# Patient Record
Sex: MALE | Race: WHITE | NOT HISPANIC OR LATINO
[De-identification: names, ages, dates, MRNs, and addresses within clinical notes are randomized per-mention and may not be internally consistent; named-entity substitution may affect disease eponyms.]

---

## 2017-04-28 ENCOUNTER — RESULT REVIEW (OUTPATIENT)
Age: 37
End: 2017-04-28

## 2017-04-28 ENCOUNTER — INPATIENT (INPATIENT)
Facility: HOSPITAL | Age: 37
LOS: 0 days | Discharge: HOME | DRG: 343 | End: 2017-04-29
Attending: SURGERY | Admitting: SURGERY
Payer: COMMERCIAL

## 2017-04-28 VITALS
WEIGHT: 151.9 LBS | OXYGEN SATURATION: 98 % | TEMPERATURE: 97 F | DIASTOLIC BLOOD PRESSURE: 84 MMHG | HEART RATE: 115 BPM | HEIGHT: 73 IN | SYSTOLIC BLOOD PRESSURE: 115 MMHG | RESPIRATION RATE: 18 BRPM

## 2017-04-28 LAB
ALBUMIN SERPL ELPH-MCNC: 4.6 G/DL — SIGNIFICANT CHANGE UP (ref 3.4–5)
ALP SERPL-CCNC: 41 U/L — SIGNIFICANT CHANGE UP (ref 40–120)
ALT FLD-CCNC: 33 U/L — SIGNIFICANT CHANGE UP (ref 12–42)
ANION GAP SERPL CALC-SCNC: 9 MMOL/L — SIGNIFICANT CHANGE UP (ref 9–16)
APPEARANCE UR: CLEAR — SIGNIFICANT CHANGE UP
APTT BLD: 28.7 SEC — SIGNIFICANT CHANGE UP (ref 27.5–37.4)
AST SERPL-CCNC: 19 U/L — SIGNIFICANT CHANGE UP (ref 15–37)
BASOPHILS NFR BLD AUTO: 0.3 % — SIGNIFICANT CHANGE UP (ref 0–2)
BILIRUB SERPL-MCNC: 1.4 MG/DL — HIGH (ref 0.2–1.2)
BILIRUB UR-MCNC: NEGATIVE — SIGNIFICANT CHANGE UP
BLD GP AB SCN SERPL QL: NEGATIVE — SIGNIFICANT CHANGE UP
BUN SERPL-MCNC: 11 MG/DL — SIGNIFICANT CHANGE UP (ref 7–23)
CALCIUM SERPL-MCNC: 9.5 MG/DL — SIGNIFICANT CHANGE UP (ref 8.5–10.5)
CHLORIDE SERPL-SCNC: 104 MMOL/L — SIGNIFICANT CHANGE UP (ref 96–108)
CO2 SERPL-SCNC: 26 MMOL/L — SIGNIFICANT CHANGE UP (ref 22–31)
COLOR SPEC: YELLOW — SIGNIFICANT CHANGE UP
CREAT SERPL-MCNC: 0.86 MG/DL — SIGNIFICANT CHANGE UP (ref 0.5–1.3)
DIFF PNL FLD: NEGATIVE — SIGNIFICANT CHANGE UP
EOSINOPHIL NFR BLD AUTO: 0.1 % — SIGNIFICANT CHANGE UP (ref 0–6)
GLUCOSE SERPL-MCNC: 97 MG/DL — SIGNIFICANT CHANGE UP (ref 70–99)
GLUCOSE UR QL: NEGATIVE — SIGNIFICANT CHANGE UP
HCT VFR BLD CALC: 40.6 % — SIGNIFICANT CHANGE UP (ref 39–50)
HGB BLD-MCNC: 14.7 G/DL — SIGNIFICANT CHANGE UP (ref 13–17)
INR BLD: 1.02 — SIGNIFICANT CHANGE UP (ref 0.88–1.16)
KETONES UR-MCNC: NEGATIVE — SIGNIFICANT CHANGE UP
LACTATE SERPL-SCNC: 1.5 MMOL/L — SIGNIFICANT CHANGE UP (ref 0.5–2)
LEUKOCYTE ESTERASE UR-ACNC: NEGATIVE — SIGNIFICANT CHANGE UP
LYMPHOCYTES # BLD AUTO: 9 % — LOW (ref 13–44)
MCHC RBC-ENTMCNC: 31.4 PG — SIGNIFICANT CHANGE UP (ref 27–34)
MCHC RBC-ENTMCNC: 36.2 G/DL — HIGH (ref 32–36)
MCV RBC AUTO: 86.8 FL — SIGNIFICANT CHANGE UP (ref 80–100)
MONOCYTES NFR BLD AUTO: 5.3 % — SIGNIFICANT CHANGE UP (ref 2–14)
NEUTROPHILS NFR BLD AUTO: 85.3 % — HIGH (ref 43–77)
NITRITE UR-MCNC: NEGATIVE — SIGNIFICANT CHANGE UP
PH UR: 8.5 — HIGH (ref 5–8)
PLATELET # BLD AUTO: 287 K/UL — SIGNIFICANT CHANGE UP (ref 150–400)
POTASSIUM SERPL-MCNC: 3.6 MMOL/L — SIGNIFICANT CHANGE UP (ref 3.5–5.3)
POTASSIUM SERPL-SCNC: 3.6 MMOL/L — SIGNIFICANT CHANGE UP (ref 3.5–5.3)
PROT SERPL-MCNC: 8.2 G/DL — SIGNIFICANT CHANGE UP (ref 6.4–8.2)
PROT UR-MCNC: NEGATIVE MG/DL — SIGNIFICANT CHANGE UP
PROTHROM AB SERPL-ACNC: 11.3 SEC — SIGNIFICANT CHANGE UP (ref 9.8–12.7)
RBC # BLD: 4.68 M/UL — SIGNIFICANT CHANGE UP (ref 4.2–5.8)
RBC # FLD: 13.1 % — SIGNIFICANT CHANGE UP (ref 10.3–16.9)
RH IG SCN BLD-IMP: POSITIVE — SIGNIFICANT CHANGE UP
SODIUM SERPL-SCNC: 139 MMOL/L — SIGNIFICANT CHANGE UP (ref 135–145)
SP GR SPEC: 1.01 — SIGNIFICANT CHANGE UP (ref 1–1.03)
UROBILINOGEN FLD QL: 0.2 E.U./DL — SIGNIFICANT CHANGE UP
WBC # BLD: 12 K/UL — HIGH (ref 3.8–10.5)
WBC # FLD AUTO: 12 K/UL — HIGH (ref 3.8–10.5)

## 2017-04-28 PROCEDURE — 44970 LAPAROSCOPY APPENDECTOMY: CPT

## 2017-04-28 PROCEDURE — 71020: CPT | Mod: 26

## 2017-04-28 PROCEDURE — 99285 EMERGENCY DEPT VISIT HI MDM: CPT | Mod: 25

## 2017-04-28 PROCEDURE — 93010 ELECTROCARDIOGRAM REPORT: CPT | Mod: NC

## 2017-04-28 PROCEDURE — 99221 1ST HOSP IP/OBS SF/LOW 40: CPT | Mod: 57

## 2017-04-28 RX ORDER — HYDROMORPHONE HYDROCHLORIDE 2 MG/ML
0.2 INJECTION INTRAMUSCULAR; INTRAVENOUS; SUBCUTANEOUS EVERY 4 HOURS
Qty: 0 | Refills: 0 | Status: DISCONTINUED | OUTPATIENT
Start: 2017-04-28 | End: 2017-04-29

## 2017-04-28 RX ORDER — ONDANSETRON 8 MG/1
4 TABLET, FILM COATED ORAL EVERY 4 HOURS
Qty: 0 | Refills: 0 | Status: DISCONTINUED | OUTPATIENT
Start: 2017-04-28 | End: 2017-04-29

## 2017-04-28 RX ORDER — HYDROMORPHONE HYDROCHLORIDE 2 MG/ML
0.5 INJECTION INTRAMUSCULAR; INTRAVENOUS; SUBCUTANEOUS EVERY 4 HOURS
Qty: 0 | Refills: 0 | Status: DISCONTINUED | OUTPATIENT
Start: 2017-04-28 | End: 2017-04-29

## 2017-04-28 RX ORDER — CEFOTETAN DISODIUM 1 G
2 VIAL (EA) INJECTION ONCE
Qty: 0 | Refills: 0 | Status: COMPLETED | OUTPATIENT
Start: 2017-04-28 | End: 2017-04-28

## 2017-04-28 RX ORDER — ONDANSETRON 8 MG/1
4 TABLET, FILM COATED ORAL ONCE
Qty: 0 | Refills: 0 | Status: COMPLETED | OUTPATIENT
Start: 2017-04-28 | End: 2017-04-28

## 2017-04-28 RX ORDER — HYDROMORPHONE HYDROCHLORIDE 2 MG/ML
0.5 INJECTION INTRAMUSCULAR; INTRAVENOUS; SUBCUTANEOUS
Qty: 0 | Refills: 0 | Status: DISCONTINUED | OUTPATIENT
Start: 2017-04-28 | End: 2017-04-29

## 2017-04-28 RX ADMIN — Medication 100 GRAM(S): at 13:33

## 2017-04-28 NOTE — H&P ADULT - NSHPREVIEWOFSYSTEMS_GEN_ALL_CORE
General: No fevers/chills, decreased appetite  HEENT: No blurry or double vision, no runny nose  Respiratory: No cough, no shortness of breath  Cardiovascular: No palpitations, no chest pain  Gastrointestinal: + abdominal pain, nausea; No emesis, constipation, diarrhea, melena  Genitourinary: No dysuria, no urinary frequency  Integumentary: No rashes  Musculoskeletal: No muscle aches, no joint pain  Neuro: No numbness or tingling, difficulty with gait  Psych: No depression, no change in behavior

## 2017-04-28 NOTE — ED ADULT NURSE NOTE - OBJECTIVE STATEMENT
Pt came to ED complaining of RLQ pain starting yesterday evening. Pt went to Urgent care and CT, instructed by MD to come to ED for confirmed appendicitis.  Pt currently denies nausea, vomiting, reports diarrhea. Abd is soft, tender in RLQ.  Pt denies chest pain, SOB,  symptoms, Positive PMS x4 extremities, alert and oriented x3.

## 2017-04-28 NOTE — ED ADULT NURSE NOTE - CHPI ED SYMPTOMS NEG
no hematuria/no vomiting/no chills/no abdominal distension/no blood in stool/no burning urination/no fever/no dysuria

## 2017-04-28 NOTE — H&P ADULT - NSHPPHYSICALEXAM_GEN_ALL_CORE
Gen: NAD, resting comfortably in bed, wn/wd  Neuro: A&Ox3, CN II-XII grossly intact, no deficits, wells  C/V: RRR, no m/r/g, +S1S2  Pulm: CTAB, no wheeze/rales/rhonchi, no respiratory distress, no increased wob  Abd: thin, soft, ND, moderate point tenderness RLQ, + rovsing's sign, - psoas/obturator signs, no rebound/guarding, +BS  Ext/Pulses: wwp, no c/c/e, palpable DP/PT b/l

## 2017-04-28 NOTE — H&P ADULT - ATTENDING COMMENTS
Patient seen and examined with surgical housestaff. CT scan reviewed (images). His history, physical examination and imaging are consistent with acute appendicitis. We discussed treatment options. We discussed the risks, benefits and alternatives to laparoscopic appendectomy with the patient including but not limited to bleeding, infection, death, disability, need for additional procedures, chronic pain, numbness, abscess, leak, need for open procedure and other issues. he does wish to proceed with surgery. I answered all questions.

## 2017-04-28 NOTE — PROGRESS NOTE ADULT - SUBJECTIVE AND OBJECTIVE BOX
Surgery Post-Op Note    Pre-Op Dx: Acute appendicitis  Procedure: Appendectomy, laparoscopic    Surgeon: Zaire    Subjective:   Pt seen and examined at the bedside. Pt w/ no complaints. Denies F/C/N/V. Pain controlled with medication.     Vital Signs Last 24 Hrs  T(C): 37.2, Max: 37.2 ( @ 21:00)  T(F): 99, Max: 99 ( @ 21:00)  HR: 89 (74 - 115)  BP: 111/74 (101/58 - 129/76)  BP(mean): --  RR: 14 (14 - 18)  SpO2: 98% (98% - 100%)    Physical Exam:  General: NAD, resting comfortably in bed  Neuro: A/O x 3, no focal deficits  Pulmonary: Nonlabored breathing, no respiratory distress  Cardiovascular: NSR  Abdominal: soft, ATTP. ND  Incision: C/D/I   Drains: N/A  Extremities: WWP        LABS:                        14.7   12.0  )-----------( 287      ( 2017 13:25 )             40.6         139  |  104  |  11  ----------------------------<  97  3.6   |  26  |  0.86    Ca    9.5      2017 13:25    TPro  8.2  /  Alb  4.6  /  TBili  1.4<H>  /  DBili  x   /  AST  19  /  ALT  33  /  AlkPhos  41      PT/INR - ( 2017 13:25 )   PT: 11.3 sec;   INR: 1.02          PTT - ( 2017 13:25 )  PTT:28.7 sec  CAPILLARY BLOOD GLUCOSE    Urinalysis Basic - ( 2017 14:43 )    Color: Yellow / Appearance: Clear / S.010 / pH: x  Gluc: x / Ketone: NEGATIVE  / Bili: NEGATIVE / Urobili: 0.2 E.U./dL   Blood: x / Protein: NEGATIVE mg/dL / Nitrite: NEGATIVE   Leuk Esterase: NEGATIVE / RBC: x / WBC x   Sq Epi: x / Non Sq Epi: x / Bacteria: x      LIVER FUNCTIONS - ( 2017 13:25 )  Alb: 4.6 g/dL / Pro: 8.2 g/dL / ALK PHOS: 41 U/L / ALT: 33 U/L / AST: 19 U/L / GGT: x           ABO Interpretation: A ( @ 17:53)        Radiology and Additional Studies:    Assessment:36y Male s/p above procedure    Plan:  IVF, Diet: CLD, ADAT. HL IVF once tomas diet  Pain/nausea control PRN  Incentive spirometer/OOB/Ambulate  D/c tmrw

## 2017-04-28 NOTE — BRIEF OPERATIVE NOTE - OPERATION/FINDINGS
Nonperforated, nongangrenous appendix identified in RLQ. Mesoappendix dissected with harmonic scalpel. Appendix stapled off cecum with 45 load. Hemostasis ensured. Mild purulent fluid noted in pelvis, suctioned and irrigated. Hemostasis confirmed at end of case, appendix removed with endocatch bag. Midline fascia closed. Umbilical port site closed with interrupted Monocryl. Additional port sites closed x2.

## 2017-04-28 NOTE — H&P ADULT - HISTORY OF PRESENT ILLNESS
37yo M with pmhx of biliary dyskinesia who presents with acute onset of abdominal pain.  Patient states that the pain started around midnight last night and kept him awake.  Describes the pain as constant, located in his RLQ, non-radiating, and a 7-8/10 in severity.  Pain is associated with some mild nausea, and anorexia.  Patient also experienced some diarrhea around noon today.  Denies fevers/chills/vomiting/hematochezia/melena/dysuria/other gu symptoms.  Patient infrequently gets a right-sided mid-abdomen pain after eating fatty/fried foods 2/2 to his biliary dyskinesia.  However, he describes his dyskinesia more as a discomfort than a pain and that his symptoms today are a legitimate pain.  Patient with grandfather with possible h/o of colon cancer, and grandmother who was diagnosed with Crohn's late in life.  Patient has never had colonoscopy.  Patient went to [facility] and was sent to an outside imaging center.  CT from outside imaging center shows clear appendicitis.  Patient with point tenderness in RLQ and + Rovsing's on exam.  In ED, T 98.5, , /82, RR 18, O2 98%.  Patient received 2gm cefotetan.

## 2017-04-28 NOTE — ED PROVIDER NOTE - ATTENDING CONTRIBUTION TO CARE
36 M w RLQ pain- a few days sharp pain- outpx CT confirms appy  vss  s1s2 lungs cta bl  abd +RLQ ttp  IMP- Acute appy  stat surgery consult, abx

## 2017-04-28 NOTE — H&P ADULT - NSHPLABSRESULTS_GEN_ALL_CORE
14.7   12.0  )-----------( 287      ( 28 Apr 2017 13:25 )             40.6   04-28    139  |  104  |  11  ----------------------------<  97  3.6   |  26  |  0.86    Ca    9.5      28 Apr 2017 13:25    TPro  8.2  /  Alb  4.6  /  TBili  1.4<H>  /  DBili  x   /  AST  19  /  ALT  33  /  AlkPhos  41  04-28    CT from outside imaging center with clear acute appendicitis.  Being uploaded to in-house PACS system

## 2017-04-28 NOTE — H&P ADULT - ASSESSMENT
37yo M with pmhx of biliary dyskinesia who presents with acute onset of abdominal pain due to acute appendicitis.  Patient AVSS, WBC of 12, obvious acute appendicitis on CT without e/o perforation.      - Admit to team 4 surgery (Dr. Tai)  - OR booked for laparoscopic appendectomy  - Received 2gm IV cefotetan in ED  - NPO, IVF  - Appropriate analgesia/anti-emetics  - PPx: SCDs  - Dispo: admit vs d/c to home depending on operation

## 2017-04-28 NOTE — ED ADULT TRIAGE NOTE - CHIEF COMPLAINT QUOTE
Pt directed to ED with confirmed Appendicitis and CO Abd Pain 7/10 since midnight.  Pt states "I had a CT today and was told I should come to the ED."  Pt denies N/V/D, SOB, fevers

## 2017-04-28 NOTE — ED PROVIDER NOTE - OBJECTIVE STATEMENT
pt to ed co pain to RLQ for few days with nausea no vomiting no fever no dizzy no headache no chills no ND no chest pain no SOB no shakes no aches no other  injury no other complaints [facility] and [facility] CT scan shows Acute APPY

## 2017-04-29 ENCOUNTER — TRANSCRIPTION ENCOUNTER (OUTPATIENT)
Age: 37
End: 2017-04-29

## 2017-04-29 VITALS
SYSTOLIC BLOOD PRESSURE: 117 MMHG | HEART RATE: 79 BPM | TEMPERATURE: 97 F | RESPIRATION RATE: 15 BRPM | OXYGEN SATURATION: 97 % | DIASTOLIC BLOOD PRESSURE: 72 MMHG

## 2017-04-29 LAB
ANION GAP SERPL CALC-SCNC: 9 MMOL/L — SIGNIFICANT CHANGE UP (ref 9–16)
BUN SERPL-MCNC: 11 MG/DL — SIGNIFICANT CHANGE UP (ref 7–23)
CALCIUM SERPL-MCNC: 9.1 MG/DL — SIGNIFICANT CHANGE UP (ref 8.5–10.5)
CHLORIDE SERPL-SCNC: 106 MMOL/L — SIGNIFICANT CHANGE UP (ref 96–108)
CO2 SERPL-SCNC: 24 MMOL/L — SIGNIFICANT CHANGE UP (ref 22–31)
CREAT SERPL-MCNC: 0.78 MG/DL — SIGNIFICANT CHANGE UP (ref 0.5–1.3)
GLUCOSE SERPL-MCNC: 134 MG/DL — HIGH (ref 70–99)
HCT VFR BLD CALC: 36.6 % — LOW (ref 39–50)
HGB BLD-MCNC: 13 G/DL — SIGNIFICANT CHANGE UP (ref 13–17)
MAGNESIUM SERPL-MCNC: 2.1 MG/DL — SIGNIFICANT CHANGE UP (ref 1.6–2.4)
MCHC RBC-ENTMCNC: 31.1 PG — SIGNIFICANT CHANGE UP (ref 27–34)
MCHC RBC-ENTMCNC: 35.5 G/DL — SIGNIFICANT CHANGE UP (ref 32–36)
MCV RBC AUTO: 87.6 FL — SIGNIFICANT CHANGE UP (ref 80–100)
PHOSPHATE SERPL-MCNC: 3.4 MG/DL — SIGNIFICANT CHANGE UP (ref 2.5–4.5)
PLATELET # BLD AUTO: 272 K/UL — SIGNIFICANT CHANGE UP (ref 150–400)
POTASSIUM SERPL-MCNC: 3.8 MMOL/L — SIGNIFICANT CHANGE UP (ref 3.5–5.3)
POTASSIUM SERPL-SCNC: 3.8 MMOL/L — SIGNIFICANT CHANGE UP (ref 3.5–5.3)
RBC # BLD: 4.18 M/UL — LOW (ref 4.2–5.8)
RBC # FLD: 13.3 % — SIGNIFICANT CHANGE UP (ref 10.3–16.9)
SODIUM SERPL-SCNC: 139 MMOL/L — SIGNIFICANT CHANGE UP (ref 135–145)
WBC # BLD: 8.7 K/UL — SIGNIFICANT CHANGE UP (ref 3.8–10.5)
WBC # FLD AUTO: 8.7 K/UL — SIGNIFICANT CHANGE UP (ref 3.8–10.5)

## 2017-04-29 RX ORDER — DOCUSATE SODIUM 100 MG
1 CAPSULE ORAL
Qty: 14 | Refills: 0 | OUTPATIENT
Start: 2017-04-29 | End: 2017-05-06

## 2017-04-29 RX ORDER — POTASSIUM CHLORIDE 20 MEQ
20 PACKET (EA) ORAL ONCE
Qty: 0 | Refills: 0 | Status: COMPLETED | OUTPATIENT
Start: 2017-04-29 | End: 2017-04-29

## 2017-04-29 RX ORDER — ACETAMINOPHEN 500 MG
650 TABLET ORAL ONCE
Qty: 0 | Refills: 0 | Status: COMPLETED | OUTPATIENT
Start: 2017-04-29 | End: 2017-04-29

## 2017-04-29 RX ADMIN — Medication 650 MILLIGRAM(S): at 12:34

## 2017-04-29 RX ADMIN — Medication 20 MILLIEQUIVALENT(S): at 10:25

## 2017-04-29 NOTE — DISCHARGE NOTE ADULT - OTHER SIGNIFICANT FINDINGS
4/29: regular diet, stable for discharge  O/N: POC wnl. ADAT and d/c in AM. explained discharge instructions to pt.   4/28: admitted w/ acute appencicitis; OR for uncomplicated laparoscopic appendectomy--nonperforated, nongangrenous.

## 2017-04-29 NOTE — DISCHARGE NOTE ADULT - PLAN OF CARE
Improvement after laparascopic appendectomy Continue to advance diet as tolerated. You may shower, do not bathe or submerge in water for at least two weeks. Leave steri-strips in place, they will fall off on their own. No heavy lifting or strenuous exercise until cleared by surgeon.

## 2017-04-29 NOTE — DISCHARGE NOTE ADULT - CARE PLAN
Principal Discharge DX:	Acute appendicitis with localized peritonitis  Goal:	Improvement after laparascopic appendectomy  Instructions for follow-up, activity and diet:	Continue to advance diet as tolerated. You may shower, do not bathe or submerge in water for at least two weeks. Leave steri-strips in place, they will fall off on their own. No heavy lifting or strenuous exercise until cleared by surgeon.

## 2017-04-29 NOTE — PROGRESS NOTE ADULT - ASSESSMENT
35yo M with pmhx of biliary dyskinesia who presents with acute onset of abdominal pain due to acute appendicitis.  Patient AVSS, WBC of 12, obvious acute appendicitis on CT without e/o perforation.      CLD/ADAT  NO abx  DVT PPX: SCDs/OOB/A  Pain/nausea control PRN  D/c today 37yo M with pmhx of biliary dyskinesia who presents with acute onset of abdominal pain due to acute appendicitis.  Patient AVSS, WBC of 12, obvious acute appendicitis on CT without e/o perforation.      CLD/ADAT  NO abx  DVT PPX: SCDs/OOB/A  Pain/nausea control PRN  D/c today  37yo M with pmhx of biliary dyskinesia who presents with acute onset of abdominal pain due to acute appendicitis s/p laparoscopic appendectomy (4/28)    CLD/ADAT  NO abx  DVT PPX: SCDs/OOB/A  Pain/nausea control PRN  D/c today 35yo M with pmhx of biliary dyskinesia who presents with acute onset of abdominal pain due to acute appendicitis s/p laparoscopic appendectomy (4/28)    CLD/ADAT  NO abx  DVT PPX: SCDs/OOB/A  Pain/nausea control PRN  D/c today 37yo M with pmhx of biliary dyskinesia who presents with acute onset of abdominal pain due to acute appendicitis s/p laparoscopic appendectomy (4/28)    Reg diet  NO abx  DVT PPX: SCDs/OOB/A  Pain/nausea control PRN  D/c today

## 2017-04-29 NOTE — PROGRESS NOTE ADULT - SUBJECTIVE AND OBJECTIVE BOX
57F now s/p elective ostomy reversal on 4/28  - clear liquids  - regional  - hydromorphone PCA  - IS / OOBA / DVT PPx SUBJECTIVE: Patient seen and examined bedside by chief resident.  O/N: POC wnl. ADAT and d/c in AM. explained discharge instructions to pt.   : admitted w/ acute appencicitis; OR for uncomplicated laparoscopic appendectomy--nonperforated, nongangrenous.       Vital Signs Last 24 Hrs  T(C): 35.9, Max: 37.2 ( @ 21:00)  T(F): 96.7, Max: 99 ( @ 21:00)  HR: 87 (73 - 115)  BP: 101/59 (101/58 - 129/76)  BP(mean): --  RR: 16 (14 - 18)  SpO2: 96% (96% - 100%)  I&O's Detail    I & Os for current day (as of 2017 08:25)  =============================================  IN:    Total IN: 0 ml  ---------------------------------------------  OUT:    Voided: 1100 ml    Total OUT: 1100 ml  ---------------------------------------------  Total NET: -1100 ml      General: NAD, resting comfortably in bed  C/V: NSR  Pulm: Nonlabored breathing, no respiratory distress  Abd: soft, NT/ND. incisions c/d/i  Extrem: WWP, no edema, SCDs in place        LABS:                        13.0   8.7   )-----------( 272      ( 2017 07:10 )             36.6         139  |  106  |  11  ----------------------------<  134<H>  3.8   |  24  |  0.78    Ca    9.1      2017 07:10  Phos  3.4       Mg     2.1         TPro  8.2  /  Alb  4.6  /  TBili  1.4<H>  /  DBili  x   /  AST  19  /  ALT  33  /  AlkPhos  41      PT/INR - ( 2017 13:25 )   PT: 11.3 sec;   INR: 1.02          PTT - ( 2017 13:25 )  PTT:28.7 sec  Urinalysis Basic - ( 2017 14:43 )    Color: Yellow / Appearance: Clear / S.010 / pH: x  Gluc: x / Ketone: NEGATIVE  / Bili: NEGATIVE / Urobili: 0.2 E.U./dL   Blood: x / Protein: NEGATIVE mg/dL / Nitrite: NEGATIVE   Leuk Esterase: NEGATIVE / RBC: x / WBC x   Sq Epi: x / Non Sq Epi: x / Bacteria: x        RADIOLOGY & ADDITIONAL STUDIES:

## 2017-04-29 NOTE — DISCHARGE NOTE ADULT - PATIENT PORTAL LINK FT
“You can access the [employer] Patient Portal, offered by [facility], by registering with the following website: [email or web address]”

## 2017-04-29 NOTE — DISCHARGE NOTE ADULT - MEDICATION SUMMARY - MEDICATIONS TO TAKE
I will START or STAY ON the medications listed below when I get home from the hospital:    Percocet 5/325 oral tablet  -- 1-2 tab(s) by mouth every 4-6 hours, As Needed -for pain MDD:8 tabs  -- Caution federal law prohibits the transfer of this drug to any person other  than the person for whom it was prescribed.  May cause drowsiness.  Alcohol may intensify this effect.  Use care when operating dangerous machinery.  This prescription cannot be refilled.  This product contains acetaminophen.  Do not use  with any other product containing acetaminophen to prevent possible liver damage.  Using more of this medication than prescribed may cause serious breathing problems.    -- Indication: For Pain med    Colace 100 mg oral capsule  -- 1 cap(s) by mouth 2 times a day, As Needed  -- Medication should be taken with plenty of water.    -- Indication: For stool softener

## 2017-04-29 NOTE — DISCHARGE NOTE ADULT - HOSPITAL COURSE
4/29: adv to regular diet, stable for discharge  O/N: POC wnl. ADAT and d/c in AM. explained discharge instructions to pt.   4/28: admitted w/ acute appencicitis; OR for uncomplicated laparoscopic appendectomy--nonperforated, nongangrenous.

## 2017-05-01 PROCEDURE — 80053 COMPREHEN METABOLIC PANEL: CPT

## 2017-05-01 PROCEDURE — 86900 BLOOD TYPING SEROLOGIC ABO: CPT

## 2017-05-01 PROCEDURE — 99285 EMERGENCY DEPT VISIT HI MDM: CPT | Mod: 25

## 2017-05-01 PROCEDURE — 85610 PROTHROMBIN TIME: CPT

## 2017-05-01 PROCEDURE — 36415 COLL VENOUS BLD VENIPUNCTURE: CPT

## 2017-05-01 PROCEDURE — 96374 THER/PROPH/DIAG INJ IV PUSH: CPT

## 2017-05-01 PROCEDURE — 88302 TISSUE EXAM BY PATHOLOGIST: CPT

## 2017-05-01 PROCEDURE — 84100 ASSAY OF PHOSPHORUS: CPT

## 2017-05-01 PROCEDURE — 87040 BLOOD CULTURE FOR BACTERIA: CPT

## 2017-05-01 PROCEDURE — 93005 ELECTROCARDIOGRAM TRACING: CPT

## 2017-05-01 PROCEDURE — 85025 COMPLETE CBC W/AUTO DIFF WBC: CPT

## 2017-05-01 PROCEDURE — 85027 COMPLETE CBC AUTOMATED: CPT

## 2017-05-01 PROCEDURE — 80048 BASIC METABOLIC PNL TOTAL CA: CPT

## 2017-05-01 PROCEDURE — 83605 ASSAY OF LACTIC ACID: CPT

## 2017-05-01 PROCEDURE — 86850 RBC ANTIBODY SCREEN: CPT

## 2017-05-01 PROCEDURE — 81003 URINALYSIS AUTO W/O SCOPE: CPT

## 2017-05-01 PROCEDURE — 86901 BLOOD TYPING SEROLOGIC RH(D): CPT

## 2017-05-01 PROCEDURE — 85730 THROMBOPLASTIN TIME PARTIAL: CPT

## 2017-05-01 PROCEDURE — 83735 ASSAY OF MAGNESIUM: CPT

## 2017-05-01 PROCEDURE — 71046 X-RAY EXAM CHEST 2 VIEWS: CPT

## 2017-05-02 ENCOUNTER — APPOINTMENT (OUTPATIENT)
Dept: SURGERY | Facility: CLINIC | Age: 37
End: 2017-05-02

## 2017-05-02 VITALS
HEART RATE: 94 BPM | DIASTOLIC BLOOD PRESSURE: 75 MMHG | WEIGHT: 145 LBS | BODY MASS INDEX: 19.22 KG/M2 | OXYGEN SATURATION: 98 % | HEIGHT: 73 IN | SYSTOLIC BLOOD PRESSURE: 118 MMHG | TEMPERATURE: 97.8 F

## 2017-05-02 DIAGNOSIS — R51 HEADACHE: ICD-10-CM

## 2017-05-02 DIAGNOSIS — Z78.9 OTHER SPECIFIED HEALTH STATUS: ICD-10-CM

## 2017-05-02 DIAGNOSIS — F15.90 OTHER STIMULANT USE, UNSPECIFIED, UNCOMPLICATED: ICD-10-CM

## 2017-05-02 DIAGNOSIS — G89.18 OTHER ACUTE POSTPROCEDURAL PAIN: ICD-10-CM

## 2017-05-02 RX ORDER — OXYCODONE AND ACETAMINOPHEN 5; 325 MG/1; MG/1
5-325 TABLET ORAL
Qty: 24 | Refills: 0 | Status: COMPLETED | COMMUNITY
Start: 2017-04-29 | End: 2017-05-02

## 2017-05-02 RX ORDER — DOCUSATE SODIUM 100 MG/1
100 CAPSULE, LIQUID FILLED ORAL
Qty: 14 | Refills: 0 | Status: ACTIVE | COMMUNITY
Start: 2017-04-29

## 2017-05-02 RX ORDER — IBUPROFEN 600 MG/1
600 TABLET, FILM COATED ORAL
Qty: 60 | Refills: 1 | Status: ACTIVE | COMMUNITY
Start: 2017-05-02 | End: 1900-01-01

## 2017-05-02 RX ORDER — OXYCODONE HYDROCHLORIDE AND ACETAMINOPHEN 5; 325 MG/1; MG/1
5-325 TABLET ORAL
Refills: 0 | Status: COMPLETED | COMMUNITY
End: 2017-05-02

## 2017-05-03 ENCOUNTER — TRANSCRIPTION ENCOUNTER (OUTPATIENT)
Age: 37
End: 2017-05-03

## 2017-05-03 PROBLEM — F15.90 CAFFEINE USE: Status: ACTIVE | Noted: 2017-05-02

## 2017-05-03 PROBLEM — Z78.9 SOCIAL ALCOHOL USE: Status: ACTIVE | Noted: 2017-05-02

## 2017-05-03 PROBLEM — R51 HEADACHE: Status: ACTIVE | Noted: 2017-05-03

## 2017-05-03 LAB
CULTURE RESULTS: SIGNIFICANT CHANGE UP
CULTURE RESULTS: SIGNIFICANT CHANGE UP
SPECIMEN SOURCE: SIGNIFICANT CHANGE UP
SPECIMEN SOURCE: SIGNIFICANT CHANGE UP

## 2017-05-05 DIAGNOSIS — K35.80 UNSPECIFIED ACUTE APPENDICITIS: ICD-10-CM

## 2017-05-05 DIAGNOSIS — K35.3 ACUTE APPENDICITIS WITH LOCALIZED PERITONITIS: ICD-10-CM

## 2017-05-05 DIAGNOSIS — Z80.0 FAMILY HISTORY OF MALIGNANT NEOPLASM OF DIGESTIVE ORGANS: ICD-10-CM

## 2017-05-08 ENCOUNTER — APPOINTMENT (OUTPATIENT)
Dept: SURGERY | Facility: CLINIC | Age: 37
End: 2017-05-08

## 2017-05-08 VITALS
BODY MASS INDEX: 19.35 KG/M2 | HEART RATE: 65 BPM | DIASTOLIC BLOOD PRESSURE: 71 MMHG | OXYGEN SATURATION: 98 % | HEIGHT: 73 IN | TEMPERATURE: 97.7 F | WEIGHT: 146 LBS | SYSTOLIC BLOOD PRESSURE: 111 MMHG

## 2017-05-08 DIAGNOSIS — K35.2 ACUTE APPENDICITIS WITH GENERALIZED PERITONITIS: ICD-10-CM

## 2017-05-08 LAB — SURGICAL PATHOLOGY STUDY: SIGNIFICANT CHANGE UP

## 2017-05-25 PROBLEM — K35.2 ACUTE APPENDICITIS WITH GENERALIZED PERITONITIS: Status: ACTIVE | Noted: 2017-05-03

## 2017-06-16 ENCOUNTER — APPOINTMENT (OUTPATIENT)
Dept: INTERNAL MEDICINE | Facility: CLINIC | Age: 37
End: 2017-06-16

## 2017-06-16 VITALS
TEMPERATURE: 97.8 F | WEIGHT: 146 LBS | RESPIRATION RATE: 14 BRPM | OXYGEN SATURATION: 99 % | HEIGHT: 78 IN | HEART RATE: 66 BPM | DIASTOLIC BLOOD PRESSURE: 70 MMHG | BODY MASS INDEX: 16.89 KG/M2 | SYSTOLIC BLOOD PRESSURE: 110 MMHG

## 2017-06-16 DIAGNOSIS — G44.209 TENSION-TYPE HEADACHE, UNSPECIFIED, NOT INTRACTABLE: ICD-10-CM

## 2017-06-16 DIAGNOSIS — Z00.00 ENCOUNTER FOR GENERAL ADULT MEDICAL EXAMINATION W/OUT ABNORMAL FINDINGS: ICD-10-CM

## 2017-06-16 DIAGNOSIS — Z80.1 FAMILY HISTORY OF MALIGNANT NEOPLASM OF TRACHEA, BRONCHUS AND LUNG: ICD-10-CM

## 2017-06-19 LAB
ALBUMIN SERPL ELPH-MCNC: 4.7 G/DL
ALP BLD-CCNC: 38 U/L
ALT SERPL-CCNC: 19 U/L
ANION GAP SERPL CALC-SCNC: 20 MMOL/L
AST SERPL-CCNC: 21 U/L
BASOPHILS # BLD AUTO: 0.09 K/UL
BASOPHILS NFR BLD AUTO: 1.5 %
BILIRUB SERPL-MCNC: 0.7 MG/DL
BUN SERPL-MCNC: 22 MG/DL
CALCIUM SERPL-MCNC: 9.8 MG/DL
CHLORIDE SERPL-SCNC: 101 MMOL/L
CHOLEST SERPL-MCNC: 167 MG/DL
CHOLEST/HDLC SERPL: 2.7 RATIO
CO2 SERPL-SCNC: 22 MMOL/L
CREAT SERPL-MCNC: 1.12 MG/DL
EOSINOPHIL # BLD AUTO: 0.19 K/UL
EOSINOPHIL NFR BLD AUTO: 3.2 %
GLUCOSE SERPL-MCNC: 78 MG/DL
HCT VFR BLD CALC: 41.1 %
HDLC SERPL-MCNC: 63 MG/DL
HGB BLD-MCNC: 13.6 G/DL
IMM GRANULOCYTES NFR BLD AUTO: 0.2 %
LDLC SERPL CALC-MCNC: 88 MG/DL
LYMPHOCYTES # BLD AUTO: 2.87 K/UL
LYMPHOCYTES NFR BLD AUTO: 47.9 %
MAN DIFF?: NORMAL
MCHC RBC-ENTMCNC: 30.2 PG
MCHC RBC-ENTMCNC: 33.1 GM/DL
MCV RBC AUTO: 91.1 FL
MONOCYTES # BLD AUTO: 0.53 K/UL
MONOCYTES NFR BLD AUTO: 8.8 %
NEUTROPHILS # BLD AUTO: 2.3 K/UL
NEUTROPHILS NFR BLD AUTO: 38.4 %
PLATELET # BLD AUTO: 287 K/UL
POTASSIUM SERPL-SCNC: 4.1 MMOL/L
PROT SERPL-MCNC: 7.6 G/DL
RBC # BLD: 4.51 M/UL
RBC # FLD: 13.6 %
SODIUM SERPL-SCNC: 143 MMOL/L
TRIGL SERPL-MCNC: 79 MG/DL
TSH SERPL-ACNC: 1.68 UIU/ML
WBC # FLD AUTO: 5.99 K/UL

## 2017-07-05 ENCOUNTER — TRANSCRIPTION ENCOUNTER (OUTPATIENT)
Age: 37
End: 2017-07-05

## 2017-07-06 ENCOUNTER — TRANSCRIPTION ENCOUNTER (OUTPATIENT)
Age: 37
End: 2017-07-06

## 2019-03-21 ENCOUNTER — TRANSCRIPTION ENCOUNTER (OUTPATIENT)
Age: 39
End: 2019-03-21
